# Patient Record
Sex: MALE | URBAN - METROPOLITAN AREA
[De-identification: names, ages, dates, MRNs, and addresses within clinical notes are randomized per-mention and may not be internally consistent; named-entity substitution may affect disease eponyms.]

---

## 2017-11-21 ENCOUNTER — APPOINTMENT (RX ONLY)
Dept: URBAN - METROPOLITAN AREA CLINIC 123 | Facility: CLINIC | Age: 77
Setting detail: DERMATOLOGY
End: 2017-11-21

## 2017-11-21 DIAGNOSIS — L50.3 DERMATOGRAPHIC URTICARIA: ICD-10-CM

## 2017-11-21 DIAGNOSIS — L50.1 IDIOPATHIC URTICARIA: ICD-10-CM

## 2017-11-21 PROBLEM — J45.909 UNSPECIFIED ASTHMA, UNCOMPLICATED: Status: ACTIVE | Noted: 2017-11-21

## 2017-11-21 PROBLEM — H91.90 UNSPECIFIED HEARING LOSS, UNSPECIFIED EAR: Status: ACTIVE | Noted: 2017-11-21

## 2017-11-21 PROBLEM — J44.9 CHRONIC OBSTRUCTIVE PULMONARY DISEASE, UNSPECIFIED: Status: ACTIVE | Noted: 2017-11-21

## 2017-11-21 PROCEDURE — ? PRESCRIPTION

## 2017-11-21 PROCEDURE — ? COUNSELING

## 2017-11-21 PROCEDURE — 99203 OFFICE O/P NEW LOW 30 MIN: CPT

## 2017-11-21 PROCEDURE — ? TREATMENT REGIMEN

## 2017-11-21 PROCEDURE — ? DIAGNOSIS COMMENT

## 2017-11-21 RX ORDER — HYDROXYZINE HYDROCHLORIDE 10 MG/1
TABLET, FILM COATED ORAL QHS
Qty: 30 | Refills: 3 | Status: ERX | COMMUNITY
Start: 2017-11-21

## 2017-11-21 RX ADMIN — HYDROXYZINE HYDROCHLORIDE: 10 TABLET, FILM COATED ORAL at 00:00

## 2017-11-21 ASSESSMENT — LOCATION DETAILED DESCRIPTION DERM
LOCATION DETAILED: RIGHT INFERIOR MEDIAL UPPER BACK
LOCATION DETAILED: LEFT MEDIAL UPPER BACK

## 2017-11-21 ASSESSMENT — LOCATION SIMPLE DESCRIPTION DERM
LOCATION SIMPLE: RIGHT UPPER BACK
LOCATION SIMPLE: LEFT UPPER BACK

## 2017-11-21 ASSESSMENT — LOCATION ZONE DERM: LOCATION ZONE: TRUNK

## 2017-11-21 NOTE — PROCEDURE: MIPS QUALITY
Quality 110: Preventive Care And Screening: Influenza Immunization: Influenza Immunization previously received during influenza season
Quality 47: Advance Care Plan: Advance Care Planning discussed and documented in the medical record; patient did not wish or was not able to name a surrogate decision maker or provide an advance care plan.
Quality 130: Documentation Of Current Medications In The Medical Record: Current Medications Documented
Quality 111:Pneumonia Vaccination Status For Older Adults: Pneumococcal Vaccination Administered
Quality 226: Preventive Care And Screening: Tobacco Use: Screening And Cessation Intervention: Patient screened for tobacco and never smoked
Quality 131: Pain Assessment And Follow-Up: Pain assessment using a standardized tool is documented as negative, no follow-up plan required
Detail Level: Detailed

## 2018-02-08 ENCOUNTER — IMPORTED ENCOUNTER (OUTPATIENT)
Dept: URBAN - METROPOLITAN AREA CLINIC 43 | Facility: CLINIC | Age: 78
End: 2018-02-08

## 2018-02-08 PROBLEM — H25.13: Noted: 2018-02-08

## 2018-02-08 PROBLEM — H40.013: Noted: 2018-02-08

## 2018-02-08 PROBLEM — H35.361: Noted: 2018-02-08

## 2018-02-08 PROBLEM — H25.043: Noted: 2018-02-08

## 2018-02-21 ENCOUNTER — IMPORTED ENCOUNTER (OUTPATIENT)
Dept: URBAN - METROPOLITAN AREA CLINIC 43 | Facility: CLINIC | Age: 78
End: 2018-02-21

## 2018-02-21 PROBLEM — H35.361: Noted: 2018-02-21

## 2018-02-21 PROBLEM — H43.813: Noted: 2018-02-21

## 2018-02-21 PROBLEM — H25.13: Noted: 2018-02-21

## 2018-03-08 ENCOUNTER — IMPORTED ENCOUNTER (OUTPATIENT)
Dept: URBAN - METROPOLITAN AREA CLINIC 43 | Facility: CLINIC | Age: 78
End: 2018-03-08

## 2018-03-08 PROBLEM — Z96.1: Noted: 2018-03-08

## 2018-03-13 ENCOUNTER — IMPORTED ENCOUNTER (OUTPATIENT)
Dept: URBAN - METROPOLITAN AREA CLINIC 43 | Facility: CLINIC | Age: 78
End: 2018-03-13

## 2018-03-13 PROBLEM — H25.11: Noted: 2018-03-13

## 2018-03-13 PROBLEM — Z96.1: Noted: 2018-03-13

## 2018-03-22 ENCOUNTER — IMPORTED ENCOUNTER (OUTPATIENT)
Dept: URBAN - METROPOLITAN AREA CLINIC 43 | Facility: CLINIC | Age: 78
End: 2018-03-22

## 2018-03-22 PROBLEM — Z96.1: Noted: 2018-03-22

## 2018-03-27 ENCOUNTER — IMPORTED ENCOUNTER (OUTPATIENT)
Dept: URBAN - METROPOLITAN AREA CLINIC 43 | Facility: CLINIC | Age: 78
End: 2018-03-27

## 2018-03-27 PROBLEM — Z96.1: Noted: 2018-03-27

## 2018-04-18 ENCOUNTER — IMPORTED ENCOUNTER (OUTPATIENT)
Dept: URBAN - METROPOLITAN AREA CLINIC 43 | Facility: CLINIC | Age: 78
End: 2018-04-18

## 2018-04-18 PROBLEM — H02.834: Noted: 2018-04-18

## 2018-04-18 PROBLEM — Z96.1: Noted: 2018-04-18

## 2018-04-18 PROBLEM — H02.831: Noted: 2018-04-18

## 2018-08-21 NOTE — PROCEDURE NOTE: SURGICAL
<p>Prior to commencing surgery patient identification, surgical procedure, site, and side were confirmed by Dr. Olga Newman. Following topical proparacaine anesthesia, the patient was positioned at the YAG laser, a contact lens coupled to the cornea with methylcellulose and an axial posterior capsulotomy performed without complication using 3.3 Mj x 18. Excess methylcellulose was washed from the eye, one drop of Alphagan was instilled and the patient returned to the holding area having tolerated the procedure well and without complication. </p><p>MRN [id number]</p>

## 2018-08-28 NOTE — PROCEDURE NOTE: SURGICAL
<p>Prior to commencing surgery patient identification, surgical procedure, site, and side were confirmed by Dr. Daniela Medellin. Following topical proparacaine anesthesia, the patient was positioned at the YAG laser, a contact lens coupled to the cornea with methylcellulose and an axial posterior capsulotomy performed without complication using 3.1 Mj x 19. Excess methylcellulose was washed from the eye, one drop of Alphagan was instilled and the patient returned to the holding area having tolerated the procedure well and without complication. </p><p>MRN [id number]</p>

## 2018-09-20 NOTE — PATIENT DISCUSSION
Dr. Vickers Book does not recommend surgery at this time.  Stop smoking for 6 months. No tear film is seen through slit lamp. Very poor tear film therefore sx is not advised at this time.

## 2018-11-02 ENCOUNTER — IMPORTED ENCOUNTER (OUTPATIENT)
Dept: URBAN - METROPOLITAN AREA CLINIC 43 | Facility: CLINIC | Age: 78
End: 2018-11-02

## 2018-11-02 PROBLEM — H10.45: Noted: 2018-11-02

## 2019-09-04 NOTE — PATIENT DISCUSSION
Dr. Molly Hernandez does not recommend surgery at this time.  Stop smoking for 6 months. No tear film is seen through slit lamp. Very poor tear film therefore sx is not advised at this time.

## 2020-04-19 ASSESSMENT — VISUAL ACUITY
OD_CC: 20/30 -1
OD_OTHER: 20/400.
OS_CC: 20/40 -1
OS_SC: 20/40+2
OD_SC: 20/400
OD_SC: 20/400
OS_OTHER: 20/400.
OD_OTHER: 20/400.
OS_CC: J2
OD_SC: 20/25 +1
OS_SC: J16
OS_CC: 20/30-
OD_SC: J16
OS_SC: 20/50
OD_SC: 20/50 +1
OD_CC: 20/25
OS_SC: 20/30 +1
OS_SC: 20/30
OD_CC: J1+
OD_SC: 20/400
OS_SC: 20/400
OS_SC: 20/400
OD_SC: 20/25-2
OD_OTHER: 20/400.
OD_SC: J16
OS_OTHER: 20/400.
OD_CC: J2
OS_SC: J16
OS_SC: 20/30-2
OS_CC: J3
OD_PH: 20/40
OS_SC: 20/30 -2
OD_SC: 20/30+1

## 2020-04-19 ASSESSMENT — KERATOMETRY
OS_AXISANGLE_DEGREES: 79
OD_AXISANGLE2_DEGREES: 171
OD_AXISANGLE_DEGREES: 81
OD_K2POWER_DIOPTERS: 42
OS_K1POWER_DIOPTERS: 43.75
OD_AXISANGLE_DEGREES: 88
OS_K2POWER_DIOPTERS: 42.25
OS_K2POWER_DIOPTERS: 42
OD_K2POWER_DIOPTERS: 42
OS_AXISANGLE_DEGREES: 81
OS_K1POWER_DIOPTERS: 44
OS_K2POWER_DIOPTERS: 42.25
OS_K1POWER_DIOPTERS: 44
OD_K1POWER_DIOPTERS: 43.75
OD_K1POWER_DIOPTERS: 43.75
OS_AXISANGLE2_DEGREES: 169
OS_AXISANGLE_DEGREES: 80
OS_AXISANGLE2_DEGREES: 171
OS_AXISANGLE2_DEGREES: 170
OD_AXISANGLE2_DEGREES: 178

## 2020-04-19 ASSESSMENT — TONOMETRY
OD_IOP_MMHG: 14.0
OD_IOP_MMHG: 13.0
OS_IOP_MMHG: 19.0
OD_IOP_MMHG: 24.0
OD_IOP_MMHG: 12.0
OS_IOP_MMHG: 17.0
OS_IOP_MMHG: 15.0
OD_IOP_MMHG: 14.0
OS_IOP_MMHG: 13.0
OD_IOP_MMHG: 19.0
OS_IOP_MMHG: 14.0
OS_IOP_MMHG: 12.0
OS_IOP_MMHG: 14.0

## 2025-01-23 ENCOUNTER — NEW PATIENT (OUTPATIENT)
Age: 85
End: 2025-01-23

## 2025-01-23 DIAGNOSIS — H43.813: ICD-10-CM

## 2025-01-23 DIAGNOSIS — H26.493: ICD-10-CM

## 2025-01-23 DIAGNOSIS — H02.831: ICD-10-CM

## 2025-01-23 DIAGNOSIS — H02.834: ICD-10-CM

## 2025-01-23 DIAGNOSIS — H35.3132: ICD-10-CM

## 2025-01-23 PROCEDURE — 99204 OFFICE O/P NEW MOD 45 MIN: CPT

## 2025-01-23 PROCEDURE — 92134 CPTRZ OPH DX IMG PST SGM RTA: CPT

## 2025-01-27 ENCOUNTER — CONSULTATION/EVALUATION (OUTPATIENT)
Age: 85
End: 2025-01-27

## 2025-01-27 DIAGNOSIS — H04.123: ICD-10-CM

## 2025-01-27 DIAGNOSIS — H02.403: ICD-10-CM

## 2025-01-27 DIAGNOSIS — H02.834: ICD-10-CM

## 2025-01-27 DIAGNOSIS — H02.831: ICD-10-CM

## 2025-01-27 DIAGNOSIS — H57.813: ICD-10-CM

## 2025-01-27 PROCEDURE — 92285 EXTERNAL OCULAR PHOTOGRAPHY: CPT

## 2025-01-27 PROCEDURE — 99214 OFFICE O/P EST MOD 30 MIN: CPT

## 2025-01-27 PROCEDURE — 92081 LIMITED VISUAL FIELD XM: CPT

## 2025-02-10 ENCOUNTER — SURGERY/PROCEDURE (OUTPATIENT)
Age: 85
End: 2025-02-10

## 2025-02-10 DIAGNOSIS — H26.492: ICD-10-CM

## 2025-02-10 PROCEDURE — 66821 AFTER CATARACT LASER SURGERY: CPT

## 2025-02-12 ENCOUNTER — POST-OP (OUTPATIENT)
Age: 85
End: 2025-02-12

## 2025-02-12 DIAGNOSIS — Z98.890: ICD-10-CM

## 2025-02-12 PROCEDURE — 99024 POSTOP FOLLOW-UP VISIT: CPT

## 2025-02-25 ENCOUNTER — FOLLOW UP (OUTPATIENT)
Age: 85
End: 2025-02-25

## 2025-02-25 DIAGNOSIS — Z98.890: ICD-10-CM

## 2025-02-25 PROCEDURE — 99024 POSTOP FOLLOW-UP VISIT: CPT
